# Patient Record
(demographics unavailable — no encounter records)

---

## 2024-11-18 NOTE — HISTORY OF PRESENT ILLNESS
[FreeTextEntry1] : F/u exam [de-identified] : Ms. CHRISTIANO PATEL 67 year female with a PMH HTN, DM2, Hyperlipidemia, glaucoma present to the office for a f/u. As per patient feels OK, denies complaints at present time. Was seen by an NP endo 2 mo ago, recommend  to start Mounjaro which helps with her weight and BG, as per patient was not able to get this rx due to a cost, currently is taking metformin 1000mg bid Did mammo and breast u/s on 07/18/24 - CLIF's 1

## 2024-11-18 NOTE — PHYSICAL EXAM
[TextEntry] : Constitutional: Well appearing, not in acute distress Head: Normocephalic, no lesions Eyes: PERRLA, conjunctiva is NL, clear Ear: Ear canal is normal, tympanic membrane is intact Nose: Nasal turbinates are NL Throat: Clear, no exudates, no lesions Neck: Supple, no masses Chest: Lungs are clear, no rales, no rhonchi, no wheezing Heart: Regular rate, no murmurs no gallops Abdomen: Soft, no tenderness, no masses, bowel sounds are normal : No CVAT Skin: has some dark pigmentation of the skin on the nose Neuro: AAO x 3, no focal neurological deficit.

## 2024-11-18 NOTE — PLAN
[FreeTextEntry1] : Ms. CHRISTIANO PATEL 67 year female with a PMH HTN, DM2, Hyperlipidemia, glaucoma present to the office for a f/u. F/u exam is performed. Recommend  to do a blood test today, further management will depend on the blood test results.  Meanwhile continue present meds DM2 HbA1C is 7.7 continue metformin, f/u with ophthalmologist, podiatrist, endocrinologist HTN is well controlled continue Hyzaar Glaucoma continue Xalatan Hyperlipidemia  is well controlled continue Crestor 40mgqhs F/u with a gyn - do a pap test Do a sleep study For a discoloration of the skin on the nose, f/u with dermatologist Refused flu vaccine  RTC to f/u in 2 wks. Patient is verbalized understanding

## 2025-05-01 NOTE — PHYSICAL EXAM
[TextEntry] : Constitutional: Well appearing, not in acute distress Head: Normocephalic, no lesions Eyes: PERRLA, conjunctiva is NL, clear Ear: Ear canal is normal, tympanic membrane is intact Nose: Nasal turbinates are NL Throat: Clear, no exudates, no lesions Neck: Supple, no masses Chest: Lungs are clear, no rales, no rhonchi, no wheezing Heart: Regular rate, no murmurs no gallops Breast: B/l breast is symmetric, nipple and areola is nl, Axillary LN is NL. Abdomen: Soft, no tenderness : No CVAT Skin: has some dark pigmentation of the skin on the nose Neuro: AAO x 3, no focal neurological deficit.

## 2025-05-01 NOTE — PLAN
[FreeTextEntry1] : Ms. CHRISTIANO PATEL 68 year female with a PMH HTN, DM2, Hyperlipidemia, glaucoma present to the office for a f/u. F/u exam is performed. Recommend  to do a blood test (another day, fasting) further management will depend on the blood test results.  Meanwhile continue present meds DM2 continue metformin, f/u with ophthalmologist, podiatrist, endocrinologist HTN is well controlled continue Hyzaar 100/25mg Glaucoma continue Xalatan Hyperlipidemia   continue Crestor 40mgqhs F/u with a gyn - do a pap test Do a sleep study Do a colonoscopy For a discoloration of the skin on the nose, f/u with dermatologist  RTC to f/u in 2 wks. Patient is verbalized understanding

## 2025-05-01 NOTE — HISTORY OF PRESENT ILLNESS
[FreeTextEntry1] : F/u exam [de-identified] : Ms. CHIRSTIANO PATEL 68 year female with a PMH HTN, DM2, Hyperlipidemia, glaucoma present to the office for a f/u. As per patient feels OK, denies complaints at present time. As per patient last week felt bloated , had a lot of gas. Pain went away by itself Did mammo and breast u/s on 07/18/24 - CLIF's 1

## 2025-05-20 NOTE — HISTORY OF PRESENT ILLNESS
[de-identified] : 67 yo F. with h/o HTN, DMII, Hyperlipidemia, Glaucoma and Hepatic Steatosis Referred by Dr. Hill for evaluation of lymphocytosis.   In our EMR the lymphocytosis has been present since 2016 with ALC ranging from 3.40 to 4.07 K/uL. No cytopenia.  Patient reports one month history of intermittent bloating and right sided abdominal pain described as burning. Pain is alleviated with eating and drinking. States she has history of GERD, not currently taking any medications for this. Also, reports frequent loose stools for many years since cholecystectomy. No melena or hematochezia. She has GI consultation visit scheduled in June. She is overdue for screening colonoscopy.   Patient reports chronic fatigue. She is diagnosed with sleep apnea but not currently using CPAP.  Notes intermittent chills and perspiration. No unexplained fevers, frequent infections, drenching night sweats and unintentional weight loss. She reports right shoulder soreness/ stiffness, worst when laying on her right. Attributes this to wear and tear related to previous work as a . She is now retired. Also notes, periods of imbalance, most recent was 4 days ago and associated with lightheadedness.   Social history: [employer] Retired  ex-smoker (1/2 to 1 pack/day) quit 21 years  Rare social alcohol Lives with  Has 2 children  Healthcare Maintenance: PCP- [facility] Colonoscopy- overdue Mammogram- 7/2024 BIRADS 1

## 2025-05-20 NOTE — REVIEW OF SYSTEMS
[Fatigue] : fatigue [Abdominal Pain] : abdominal pain [Joint Pain] : joint pain [Negative] : Heme/Lymph [Fever] : no fever [Night Sweats] : no night sweats [Recent Change In Weight] : ~T no recent weight change [FreeTextEntry2] : occ. chills [FreeTextEntry7] : bloating [FreeTextEntry9] : right shoulder and arm [de-identified] : occ. lightheadedness and imbalance

## 2025-05-20 NOTE — REASON FOR VISIT
[Initial Consultation] : an initial consultation for [Other: _____] : [unfilled] [FreeTextEntry2] : lymphocytosis

## 2025-05-20 NOTE — HISTORY OF PRESENT ILLNESS
[de-identified] : 69 yo F. with h/o HTN, DMII, Hyperlipidemia, Glaucoma and Hepatic Steatosis Referred by Dr. Hill for evaluation of lymphocytosis.   In our EMR the lymphocytosis has been present since 2016 with ALC ranging from 3.40 to 4.07 K/uL. No cytopenia.  Patient reports one month history of intermittent bloating and right sided abdominal pain described as burning. Pain is alleviated with eating and drinking. States she has history of GERD, not currently taking any medications for this. Also, reports frequent loose stools for many years since cholecystectomy. No melena or hematochezia. She has GI consultation visit scheduled in June. She is overdue for screening colonoscopy.   Patient reports chronic fatigue. She is diagnosed with sleep apnea but not currently using CPAP.  Notes intermittent chills and perspiration. No unexplained fevers, frequent infections, drenching night sweats and unintentional weight loss. She reports right shoulder soreness/ stiffness, worst when laying on her right. Attributes this to wear and tear related to previous work as a . She is now retired. Also notes, periods of imbalance, most recent was 4 days ago and associated with lightheadedness.   Social history: [employer] Ret[employer]  ex-smoker (1/2 to 1 pack/day) quit 21 years  Rare social alcohol Lives with  Has 2 children  Healthcare Maintenance: PCP- [facility] Colonoscopy- overdue Mammogram- 7/2024 BIRADS 1

## 2025-05-20 NOTE — PHYSICAL EXAM
[Normal] : affect appropriate [de-identified] : supple [de-identified] : no calf tenderness  [de-identified] : soft, mild tenderness to middle and right upper quadrant. No masses appreciated

## 2025-05-20 NOTE — PHYSICAL EXAM
[Normal] : affect appropriate [de-identified] : supple [de-identified] : no calf tenderness  [de-identified] : soft, mild tenderness to middle and right upper quadrant. No masses appreciated

## 2025-05-20 NOTE — ASSESSMENT
[FreeTextEntry1] : 69 yo F. with h/o HTN, DMII, Hyperlipidemia, Glaucoma and Hepatic Steatosis presenting for evaluation of lymphocytosis dating back to 2016 with ALC ranging from 3.40 to 4.07 K/uL. No cytopenia. Patient with intermittent bloating and right sided abdominal pain relieved with food and drinking, suspect gastritis- patient to address with GI.  Plan --CBC, CMP --inflammatory disorder screen: check ESR, CRP, JOHANNY and rheumatoid factor --Flow cytometry for immunophenotyping --Will call patient with lab results and plan  5/20/25- spoke with patient and reviewed lab results ALC remains mildly elevated at 4.01 K/uL Flow cytometry unremarkable Inflammatory screen negative Impression: reactive lymphocytosis. No hematologic follow up needed.

## 2025-05-20 NOTE — REVIEW OF SYSTEMS
[Fatigue] : fatigue [Abdominal Pain] : abdominal pain [Joint Pain] : joint pain [Negative] : Heme/Lymph [Fever] : no fever [Night Sweats] : no night sweats [Recent Change In Weight] : ~T no recent weight change [FreeTextEntry2] : occ. chills [FreeTextEntry7] : bloating [FreeTextEntry9] : right shoulder and arm [de-identified] : occ. lightheadedness and imbalance

## 2025-06-10 NOTE — HISTORY OF PRESENT ILLNESS
[FreeTextEntry1] : ***BOB 10, 2025*** Marie feels well overall denies new complaints , she reports losing 6lbs. SHe is presently on metformin 1000mg bid because mounjaro copay was $400/month.  She did not price out list of medications provided at last visit.   BG in office is 151mg/dL 2 hours after eating cereal    Lab review : CMP: Gluc 235mg/dL eGFR 75, Cr. 0.85 bp normal  ***Sept. 12, 2024*** Marie feels well overall denies new complaints she is presently on Metformin 1000 mg BID, SHe is wearing a MANUEL PRO Date of download:  09/12/2024  Diabetes Medications and Dosage: as above  Indication for CGMS: verify a change in the treatment regimen, identify periods of hypoglycemia/ hyperglycemia.  Modal day report: pattern.  Pt with HYPO  2% of the time (NONE below 54),  95% in target range  Hyperglycemia:  3% elevation and none > 250mg/dL  Identified issues: carbohydrate counting  dates analyzed: 08/28/2024 - 09/12/2024   HISTORY OF PRESENT ILLNESS ***Aug 28, 2024*** PCP Dr. Hill Ms. PATEL was diagnosed with Diabetes Mellitus Type 2 approximately 10 years ago. and arrives today for initial evaluation of glycemic control.   She reports history of >30 pack year history HTN, dyslipidemia, glaucoma has lasik surgery planned, and denies CAD, known complications of retinopathy, nephropathy, or neuropathy. She is presently on Metformin 1000 mg 1 tablet BID,   Self discontinued glipizide due to HYPO episodes Blood sugars are not checked.   Hypoglycemia frequency: Pt denies subjective HYPOs since discontinuing glipizide   Fingerstick glucose in the office today is 201 mg/dL 1 hour after eating.   Diet: not following ADA, reports the following typical daily routine: Wakes up at 630a Drinks Coffee with milk 2 teaspoons brown sugar, Breakfast is at am and usually consists of cereal with granola, strawberries almonds, OR toast OR oatmeal OR Arepas   .  Lunch is big meal at 1-2pm and usually consists of chicken with rice and beans, salad, avocado, half banana sometimes, beets.  Dinner is around 6-7pm and usually more of a snack like fruit.  Does not eat after 7pm and goes to bed around 10-11am.  Sleeps well. Feels she does not drink any water at all, denies soda, does drink fruit juice.     Exercise: Denies structured exercise regimen, Tries to walk 30 minutes per day, walks the dogs, retired.   Lab review: a1c- 7.7% July 2024   Last dilated eye -   Last podiatry visit  -   Last cardiology evaluation -  Last stress test -  Last 2-D Echo -  Last nephrology evaluation -  Last neurology evaluation-

## 2025-06-18 NOTE — HISTORY OF PRESENT ILLNESS
[FreeTextEntry1] : 68F with a PMH HTN, DM2, Hyperlipidemia, glaucoma for CRC screen  Last colonoscopy 15 years ago normal.  Normal daily bm without issue. No BRBPR. No abdominal pain. +FHx ? as below.  Not taking Monjauro  PMhx DM, HTN, HL, glaucoma, ? asthma PSHx cholecystectomy, BTL All NKDA; carrots, apples, almonds Meds losartan, glipizide, HCTZ, metformin, rosuvastatin SHx former smoker quit 10-15y ago, no ETOH, no drugs, retired FHx brother with cancer - unknown if colon or stomach, aunt with CRC 80s

## 2025-06-18 NOTE — PHYSICAL EXAM
[Alert] : alert [Healthy Appearing] : healthy appearing [No Acute Distress] : no acute distress [Obese (BMI >= 30)] : obese (BMI >= 30) [Oriented To Time, Place, And Person] : oriented to person, place, and time [Normal Affect] : the affect was normal [Normal Mood] : the mood was normal

## 2025-06-18 NOTE — REVIEW OF SYSTEMS
[Fever] : no fever [Chills] : no chills [Feeling Poorly] : not feeling poorly [Feeling Tired] : not feeling tired [Abdominal Pain] : no abdominal pain [Constipation] : no constipation [Diarrhea] : no diarrhea [Bleeding] : no bleeding

## 2025-07-22 NOTE — ASSESSMENT
[FreeTextEntry1] : T2DM  -Continue Metformin 1000mg 1 tablet BID -Refuses insulin at this time -Begin Ozempic 0.25mg qwk x 4 weeks then up-titrate 0.5mg qwk as directed, r&b discussed, teaching/demo completed by MINO Castro, first injection self-administered in office   RTC 4-6 weeks    Diabetes Counseling: The patient was counseled on diabetes foot care, long term vascular complications of diabetes, carbohydrate consistent diet, importance of diet and exercise to improve glycemic control, achieve weight loss and improve cardiovascular health, exercise/effect on glucose, hypoglycemia management, glucagon use, ketone testing, action and use of short and long-acting insulin, self-monitoring of blood glucose, insulin self-administration, injection technique, storage, and sharps disposal and sick-day management.  Patient was referred to ophthalmology for retinopathy screening. Risks and benefits of diabetic medications were discussed.

## 2025-07-22 NOTE — HISTORY OF PRESENT ILLNESS
[FreeTextEntry1] : ***July 22, 2025*** Marie feels well overall denies new complaints she is presently on Metformin 1000mg BID, She is under a lot of stress as just received news her brother's Cancer is worsening.   bg 188mg/dL 2 hours after eating A1c 9.8% ***BOB 10, 2025*** Marie feels well overall denies new complaints , she reports losing 6lbs. SHe is presently on metformin 1000mg bid because mounjaro copay was $400/month.  She did not price out list of medications provided at last visit.   BG in office is 151mg/dL 2 hours after eating cereal    Lab review : CMP: Gluc 235mg/dL eGFR 75, Cr. 0.85 bp normal  ***Sept. 12, 2024*** Marie feels well overall denies new complaints she is presently on Metformin 1000 mg BID, SHe is wearing a MANUEL PRO Date of download:  09/12/2024  Diabetes Medications and Dosage: as above  Indication for CGMS: verify a change in the treatment regimen, identify periods of hypoglycemia/ hyperglycemia.  Modal day report: pattern.  Pt with HYPO  2% of the time (NONE below 54),  95% in target range  Hyperglycemia:  3% elevation and none > 250mg/dL  Identified issues: carbohydrate counting  dates analyzed: 08/28/2024 - 09/12/2024   HISTORY OF PRESENT ILLNESS ***Aug 28, 2024*** PCP Dr. Hill Ms. PATEL was diagnosed with Diabetes Mellitus Type 2 approximately 10 years ago. and arrives today for initial evaluation of glycemic control.   She reports history of >30 pack year history HTN, dyslipidemia, glaucoma has lasik surgery planned, and denies CAD, known complications of retinopathy, nephropathy, or neuropathy. She is presently on Metformin 1000 mg 1 tablet BID,   Self discontinued glipizide due to HYPO episodes Blood sugars are not checked.   Hypoglycemia frequency: Pt denies subjective HYPOs since discontinuing glipizide   Fingerstick glucose in the office today is 201 mg/dL 1 hour after eating.   Diet: not following ADA, reports the following typical daily routine: Wakes up at 630a Drinks Coffee with milk 2 teaspoons brown sugar, Breakfast is at am and usually consists of cereal with granola, strawberries almonds, OR toast OR oatmeal OR Arepas   .  Lunch is big meal at 1-2pm and usually consists of chicken with rice and beans, salad, avocado, half banana sometimes, beets.  Dinner is around 6-7pm and usually more of a snack like fruit.  Does not eat after 7pm and goes to bed around 10-11am.  Sleeps well. Feels she does not drink any water at all, denies soda, does drink fruit juice.     Exercise: Denies structured exercise regimen, Tries to walk 30 minutes per day, walks the dogs, retired.   Lab review: a1c- 7.7% July 2024   Last dilated eye -   Last podiatry visit  -   Last cardiology evaluation -  Last stress test -  Last 2-D Echo -  Last nephrology evaluation -  Last neurology evaluation-